# Patient Record
Sex: MALE | Race: BLACK OR AFRICAN AMERICAN | NOT HISPANIC OR LATINO | Employment: PART TIME | URBAN - METROPOLITAN AREA
[De-identification: names, ages, dates, MRNs, and addresses within clinical notes are randomized per-mention and may not be internally consistent; named-entity substitution may affect disease eponyms.]

---
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
## 2024-10-31 ENCOUNTER — OFFICE VISIT (OUTPATIENT)
Dept: FAMILY MEDICINE CLINIC | Facility: CLINIC | Age: 19
End: 2024-10-31
Payer: COMMERCIAL

## 2024-10-31 VITALS
HEIGHT: 72 IN | SYSTOLIC BLOOD PRESSURE: 129 MMHG | OXYGEN SATURATION: 98 % | HEART RATE: 88 BPM | WEIGHT: 140 LBS | BODY MASS INDEX: 18.96 KG/M2 | DIASTOLIC BLOOD PRESSURE: 88 MMHG

## 2024-10-31 DIAGNOSIS — R19.7 DIARRHEA, UNSPECIFIED TYPE: ICD-10-CM

## 2024-10-31 DIAGNOSIS — Z13.29 SCREENING FOR THYROID DISORDER: ICD-10-CM

## 2024-10-31 DIAGNOSIS — F32.A DEPRESSION, UNSPECIFIED DEPRESSION TYPE: ICD-10-CM

## 2024-10-31 DIAGNOSIS — Z13.220 SCREENING FOR CHOLESTEROL LEVEL: ICD-10-CM

## 2024-10-31 DIAGNOSIS — M62.89 MUSCLE STIFFNESS: ICD-10-CM

## 2024-10-31 DIAGNOSIS — F41.9 ANXIETY: ICD-10-CM

## 2024-10-31 DIAGNOSIS — Z00.00 ANNUAL PHYSICAL EXAM: Primary | ICD-10-CM

## 2024-10-31 DIAGNOSIS — R61 NIGHT SWEATS: ICD-10-CM

## 2024-10-31 DIAGNOSIS — Z23 NEED FOR INFLUENZA VACCINATION: ICD-10-CM

## 2024-10-31 NOTE — PROGRESS NOTES
"Chief Complaint  Annual Exam and Diarrhea, depression, anxiety, night sweats, muscle stiffness    Subjective            Neymar Louise presents to [facility]  History of Present Illness  Pt is an annual CPE. Pt has c/o ongoing diarrhea. Pt states this has been off for years.   Pt has tried several home remedies with no relief. Pt states there are no specific triggers for this. Pt states he is lactose intolerant so dairy does affect his system.  He has had negative stool studies in the past and was referred to gasto but never kept the appt.  He would like another referral to gastro for a work up.    Pt has c/o muscle stiffness. Pt thinks this is due to anxiety/depression.   Pt has a depression score of 14. Pt would like a referral to psych. He denies any suicidal thoughts.  He reports he has a long hx of anxiety and depression and has seen a therapist many times before.    Pt is due labs. Pt would like to be checked for HIV as well. He has no known exposure or symptoms he states \"it has been a while since having sex\" he does report some night sweats.    Pt is due flu vaccine. Pt will get today in office.         Past Medical History:   Diagnosis Date    Anxiety     Depression     Wrist pain     Left       No Known Allergies     Past Surgical History:   Procedure Laterality Date    CYST REMOVAL      wrist    EAR TUBES      WRIST GANGLION EXCISION Left 6/20/2022    Procedure: LEFT WRIST GANGLION EXCISION;  Surgeon: Abealrdo Osorio MD;  Location: [facility] OR [facility] [place];  Service: Orthopedics;  Laterality: Left;        Social History     Tobacco Use    Smoking status: Never     Passive exposure: Past    Smokeless tobacco: Never   Substance Use Topics    Alcohol use: Yes     Comment: 2 drinks/week       Family History   Problem Relation Age of Onset    Heart disease Other     Diabetes Other     Malig Hyperthermia Neg Hx         Current Outpatient Medications on File Prior to Visit   Medication " "Sig    ondansetron ODT (ZOFRAN-ODT) 4 MG disintegrating tablet Place 1 tablet on the tongue Every 8 (Eight) Hours As Needed for Nausea or Vomiting.    [DISCONTINUED] dicyclomine (BENTYL) 20 MG tablet Take 1 tablet by mouth Every 8 (Eight) Hours As Needed (diarrhea). (Patient not taking: Reported on 10/31/2024)     No current facility-administered medications on file prior to visit.       Health Maintenance Due   Topic Date Due    INFLUENZA VACCINE  08/01/2024       Objective     /88   Pulse 88   Ht 182.9 cm (72\")   Wt 63.5 kg (140 lb)   SpO2 98%   BMI 18.99 kg/m²       Physical Exam  Constitutional:       General: He is not in acute distress.     Appearance: Normal appearance. He is not ill-appearing.   HENT:      Head: Normocephalic and atraumatic.      Right Ear: Tympanic membrane, ear canal and external ear normal.      Left Ear: Tympanic membrane, ear canal and external ear normal.      Nose: Nose normal.   Cardiovascular:      Rate and Rhythm: Normal rate and regular rhythm.      Heart sounds: Normal heart sounds. No murmur heard.  Pulmonary:      Effort: Pulmonary effort is normal. No respiratory distress.      Breath sounds: Normal breath sounds.   Chest:      Chest wall: No tenderness.   Abdominal:      General: Abdomen is flat. Bowel sounds are normal. There is no distension.      Palpations: Abdomen is soft. There is no mass.      Tenderness: There is no abdominal tenderness. There is no guarding.   Musculoskeletal:         General: No swelling or tenderness. Normal range of motion.      Cervical back: Normal range of motion and neck supple.   Skin:     General: Skin is warm and dry.      Findings: No rash.   Neurological:      General: No focal deficit present.      Mental Status: He is alert and oriented to person, place, and time. Mental status is at baseline.      Gait: Gait normal.   Psychiatric:         Attention and Perception: Attention normal.         Mood and Affect: Mood and affect " normal.         Speech: Speech normal.         Behavior: Behavior normal. Behavior is cooperative.         Thought Content: Thought content normal. Thought content does not include suicidal ideation.         Judgment: Judgment normal.           Result Review :                           Assessment and Plan        Diagnoses and all orders for this visit:    1. Annual physical exam (Primary)  -     CBC w AUTO Differential; Future  -     Comprehensive metabolic panel; Future  -     Lipid panel; Future  -     TSH; Future    2. Diarrhea, unspecified type  -     CBC w AUTO Differential; Future  -     Comprehensive metabolic panel; Future  -     Ambulatory Referral to Gastroenterology    3. Screening for thyroid disorder  -     TSH; Future    4. Screening for cholesterol level  -     Lipid panel; Future    5. Muscle stiffness  -     CBC w AUTO Differential; Future  -     Comprehensive metabolic panel; Future  -     C-reactive protein; Future    6. Need for influenza vaccination  -     Fluzone >6mos ([id number])    7. Depression, unspecified depression type  -     Ambulatory Referral to Psychiatry    8. Anxiety  -     Ambulatory Referral to Psychiatry    9. Night sweats  -     HIV-1 & HIV-2 Antibodies; Future  -     Hepatitis panel, acute; Future      Preventative Counseling:  Healthy diet  Daily exercise  Get adequate sleep        Follow Up     Return in about 1 year (around 10/31/2025), or if symptoms worsen or fail to improve, for Annual physical.    Patient was given instructions and counseling regarding his condition or for health maintenance advice. Please see specific information pulled into the AVS if appropriate.     Neymar Louise  reports that he has never smoked. He has been exposed to tobacco smoke. He has never used smokeless tobacco.

## 2024-11-05 ENCOUNTER — LAB (OUTPATIENT)
Dept: LAB | Facility: HOSPITAL | Age: 19
End: 2024-11-05
Payer: COMMERCIAL

## 2024-11-05 DIAGNOSIS — Z13.29 SCREENING FOR THYROID DISORDER: ICD-10-CM

## 2024-11-05 DIAGNOSIS — M62.89 MUSCLE STIFFNESS: ICD-10-CM

## 2024-11-05 DIAGNOSIS — Z00.00 ANNUAL PHYSICAL EXAM: ICD-10-CM

## 2024-11-05 DIAGNOSIS — R61 NIGHT SWEATS: ICD-10-CM

## 2024-11-05 DIAGNOSIS — R19.7 DIARRHEA, UNSPECIFIED TYPE: ICD-10-CM

## 2024-11-05 DIAGNOSIS — Z13.220 SCREENING FOR CHOLESTEROL LEVEL: ICD-10-CM

## 2024-11-05 LAB
ALBUMIN SERPL-MCNC: 4.7 G/DL (ref 3.5–5.2)
ALBUMIN/GLOB SERPL: 1.8 G/DL
ALP SERPL-CCNC: 80 U/L (ref 39–117)
ALT SERPL W P-5'-P-CCNC: 11 U/L (ref 1–41)
ANION GAP SERPL CALCULATED.3IONS-SCNC: 9.8 MMOL/L (ref 5–15)
AST SERPL-CCNC: 12 U/L (ref 1–40)
BASOPHILS # BLD AUTO: 0.07 10*3/MM3 (ref 0–0.2)
BASOPHILS NFR BLD AUTO: 0.9 % (ref 0–1.5)
BILIRUB SERPL-MCNC: 0.9 MG/DL (ref 0–1.2)
BUN SERPL-MCNC: 5 MG/DL (ref 6–20)
BUN/CREAT SERPL: 5.5 (ref 7–25)
CALCIUM SPEC-SCNC: 9.8 MG/DL (ref 8.6–10.5)
CHLORIDE SERPL-SCNC: 103 MMOL/L (ref 98–107)
CHOLEST SERPL-MCNC: 118 MG/DL (ref 0–200)
CO2 SERPL-SCNC: 27.2 MMOL/L (ref 22–29)
CREAT SERPL-MCNC: 0.91 MG/DL (ref 0.76–1.27)
CRP SERPL-MCNC: <0.3 MG/DL (ref 0–0.5)
DEPRECATED RDW RBC AUTO: 42 FL (ref 37–54)
EGFRCR SERPLBLD CKD-EPI 2021: 124.5 ML/MIN/1.73
EOSINOPHIL # BLD AUTO: 0.05 10*3/MM3 (ref 0–0.4)
EOSINOPHIL NFR BLD AUTO: 0.6 % (ref 0.3–6.2)
ERYTHROCYTE [DISTWIDTH] IN BLOOD BY AUTOMATED COUNT: 13.9 % (ref 12.3–15.4)
GLOBULIN UR ELPH-MCNC: 2.6 GM/DL
GLUCOSE SERPL-MCNC: 92 MG/DL (ref 65–99)
HAV IGM SERPL QL IA: NORMAL
HBV CORE IGM SERPL QL IA: NORMAL
HBV SURFACE AG SERPL QL IA: NORMAL
HCT VFR BLD AUTO: 45.1 % (ref 37.5–51)
HCV AB SER QL: NORMAL
HDLC SERPL-MCNC: 37 MG/DL (ref 40–60)
HGB BLD-MCNC: 15.3 G/DL (ref 13–17.7)
HIV 1+2 AB+HIV1 P24 AG SERPL QL IA: NORMAL
IMM GRANULOCYTES # BLD AUTO: 0.01 10*3/MM3 (ref 0–0.05)
IMM GRANULOCYTES NFR BLD AUTO: 0.1 % (ref 0–0.5)
LDLC SERPL CALC-MCNC: 68 MG/DL (ref 0–100)
LDLC/HDLC SERPL: 1.86 {RATIO}
LYMPHOCYTES # BLD AUTO: 1.85 10*3/MM3 (ref 0.7–3.1)
LYMPHOCYTES NFR BLD AUTO: 22.8 % (ref 19.6–45.3)
MCH RBC QN AUTO: 28.5 PG (ref 26.6–33)
MCHC RBC AUTO-ENTMCNC: 33.9 G/DL (ref 31.5–35.7)
MCV RBC AUTO: 84 FL (ref 79–97)
MONOCYTES # BLD AUTO: 0.63 10*3/MM3 (ref 0.1–0.9)
MONOCYTES NFR BLD AUTO: 7.8 % (ref 5–12)
NEUTROPHILS NFR BLD AUTO: 5.49 10*3/MM3 (ref 1.7–7)
NEUTROPHILS NFR BLD AUTO: 67.8 % (ref 42.7–76)
NRBC BLD AUTO-RTO: 0 /100 WBC (ref 0–0.2)
PLATELET # BLD AUTO: 292 10*3/MM3 (ref 140–450)
PMV BLD AUTO: 11 FL (ref 6–12)
POTASSIUM SERPL-SCNC: 3.8 MMOL/L (ref 3.5–5.2)
PROT SERPL-MCNC: 7.3 G/DL (ref 6–8.5)
RBC # BLD AUTO: 5.37 10*6/MM3 (ref 4.14–5.8)
SODIUM SERPL-SCNC: 140 MMOL/L (ref 136–145)
TRIGL SERPL-MCNC: 60 MG/DL (ref 0–150)
TSH SERPL DL<=0.05 MIU/L-ACNC: 0.68 UIU/ML (ref 0.27–4.2)
VLDLC SERPL-MCNC: 13 MG/DL (ref 5–40)
WBC NRBC COR # BLD AUTO: 8.1 10*3/MM3 (ref 3.4–10.8)

## 2024-11-05 PROCEDURE — 80061 LIPID PANEL: CPT

## 2024-11-05 PROCEDURE — 80074 ACUTE HEPATITIS PANEL: CPT

## 2024-11-05 PROCEDURE — 86140 C-REACTIVE PROTEIN: CPT

## 2024-11-05 PROCEDURE — 80050 GENERAL HEALTH PANEL: CPT

## 2024-11-05 PROCEDURE — 36415 COLL VENOUS BLD VENIPUNCTURE: CPT

## 2024-11-05 PROCEDURE — G0432 EIA HIV-1/HIV-2 SCREEN: HCPCS

## 2024-11-06 ENCOUNTER — TELEPHONE (OUTPATIENT)
Dept: FAMILY MEDICINE CLINIC | Facility: CLINIC | Age: 19
End: 2024-11-06
Payer: COMMERCIAL

## 2024-11-06 NOTE — TELEPHONE ENCOUNTER
----- Message from Yamileth NEUMANN sent at 11/6/2024  6:45 AM EST -----    ----- Message -----  From: Nanette Hare APRN  Sent: 11/6/2024   6:16 AM EST  To: Yamileth coburn

## 2024-11-06 NOTE — TELEPHONE ENCOUNTER
Name: Dani Neymar    Relationship: Self    Best Callback Number: [phone]     HUB PROVIDED THE RELAY MESSAGE FROM THE OFFICE   PATIENT VOICED UNDERSTANDING AND HAS NO FURTHER QUESTIONS AT THIS TIME

## 2025-01-09 ENCOUNTER — OFFICE VISIT (OUTPATIENT)
Dept: BEHAVIORAL HEALTH | Facility: CLINIC | Age: 20
End: 2025-01-09
Payer: COMMERCIAL

## 2025-01-09 VITALS
HEART RATE: 73 BPM | HEIGHT: 72 IN | BODY MASS INDEX: 22.21 KG/M2 | WEIGHT: 164 LBS | DIASTOLIC BLOOD PRESSURE: 64 MMHG | SYSTOLIC BLOOD PRESSURE: 112 MMHG

## 2025-01-09 DIAGNOSIS — F42.2 MIXED OBSESSIONAL THOUGHTS AND ACTS: Primary | ICD-10-CM

## 2025-01-09 DIAGNOSIS — G47.00 INSOMNIA, UNSPECIFIED TYPE: ICD-10-CM

## 2025-01-09 DIAGNOSIS — F17.290 OTHER TOBACCO PRODUCT NICOTINE DEPENDENCE, UNCOMPLICATED: ICD-10-CM

## 2025-01-09 DIAGNOSIS — F33.2 SEVERE EPISODE OF RECURRENT MAJOR DEPRESSIVE DISORDER, WITHOUT PSYCHOTIC FEATURES: ICD-10-CM

## 2025-01-09 RX ORDER — MIRTAZAPINE 15 MG/1
15 TABLET, FILM COATED ORAL NIGHTLY
Qty: 90 TABLET | Refills: 0 | Status: SHIPPED | OUTPATIENT
Start: 2025-01-09 | End: 2025-04-09
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
## 2025-01-27 ENCOUNTER — TELEMEDICINE (OUTPATIENT)
Dept: BEHAVIORAL HEALTH | Facility: CLINIC | Age: 20
End: 2025-01-27
Payer: COMMERCIAL

## 2025-01-27 DIAGNOSIS — F41.1 GENERALIZED ANXIETY DISORDER: ICD-10-CM

## 2025-01-27 DIAGNOSIS — F33.2 SEVERE EPISODE OF RECURRENT MAJOR DEPRESSIVE DISORDER, WITHOUT PSYCHOTIC FEATURES: Primary | ICD-10-CM

## 2025-01-27 PROCEDURE — 90791 PSYCH DIAGNOSTIC EVALUATION: CPT | Performed by: SOCIAL WORKER

## 2025-01-27 PROCEDURE — 1160F RVW MEDS BY RX/DR IN RCRD: CPT | Performed by: SOCIAL WORKER

## 2025-01-27 PROCEDURE — 1159F MED LIST DOCD IN RCRD: CPT | Performed by: SOCIAL WORKER
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
## 2025-02-11 ENCOUNTER — OFFICE VISIT (OUTPATIENT)
Dept: BEHAVIORAL HEALTH | Facility: CLINIC | Age: 20
End: 2025-02-11
Payer: COMMERCIAL

## 2025-02-11 DIAGNOSIS — F41.1 GENERALIZED ANXIETY DISORDER: ICD-10-CM

## 2025-02-11 DIAGNOSIS — F33.2 SEVERE EPISODE OF RECURRENT MAJOR DEPRESSIVE DISORDER, WITHOUT PSYCHOTIC FEATURES: Primary | ICD-10-CM

## 2025-02-11 NOTE — PROGRESS NOTES
"  [facility]   [address]  [address]  [place] [address]     Referring physician/provider: No ref. provider found   PCP: Nanette Hare APRN    Date of service: 2/11/2025        Time In: 2:01 PM  Time Out: 2:58 PM    Patient Name: Neymar Louise  Patient Age: 19 y.o.    Chief Complaint: Depression and Anxiety     Subjective          Neymar is a 19 y.o. male who presents today as a follow-up for continued psychotherapy. PT reports he has felt \"unnecessary bouts of anger\" and feels this is related to his medication as this was not something that was happening prior to beginning the medication. PT reports being on edge, more hypervigilant and feels constant paranoia. PT reports he has felt this way since \"teenage years\". PT described feeling he had to be hyper aware of others as he felt extremely judged by his grandmother growing up. PT discussed distress related to memory as he will often walk into a room and not remember why he came into the room. PT also discussed falling asleep at times when he \"shouldn't\" such as when driving or in the shower and has fallen asleep on the stairs. When discussing memory, PT discussed feeling as though  he is often thinking of \"96\" things at once, and described going in and out of the bathroom around \"20 times\" before being able to shower.       Assessment    Mental Status Exam     Appearance: good hygiene and dressed appropriately for the weather  Behavior: calm  Cooperation:  engaged, cooperative, attentive, and friendly  Eye Contact:  good  Affect:  bright  Mood: euthymic and expressive  Speech: responsive  Thought Process:  flight of ideas at times  Thought Content: appropriate  Suicidal: denies  Homicidal:  denies  Hallucinations:  denies  Memory:  intact during session, Reports memory difficulties (forgetting why walked into room)  Orientation:  person, place, time, and situation  Reliability:  reliable  Insight:  good  Judgement:  good "     Clinical Intervention     Diagnoses and all orders for this visit:    1. Severe episode of recurrent major depressive disorder, without psychotic features (Primary)    2. Generalized anxiety disorder          Individual psychotherapy was provided utilizing CBT techniques to build rapport, encourage expression of thoughts and feelings, identify goals for treatment, identify triggers, recognize patterns of behavior, acknowledge sources of feelings and behaviors, assess symptoms, recognize cognitive distortions, and provide support. PT and therapist discussed additional symptoms PT experiences and explored the possibility of ADHD. Therapist utilized reflective listening and validated PT's feelings.     Plan   Plan & Goals     PT will focus on symptoms experiencing.       Patient acknowledged and verbally consented to continue working toward resolving current treatment plan goals and was educated on the importance of participation in the therapeutic process.  Patient will remain compliant with medication regimen as prescribed. Discuss any medication side effects, questions or concerns with prescribing provider.  Call 911 or present to the nearest emergency room in an emergency situation.   National Suicide Prevention Lifeline: Call 988. The Lifeline provides 24/7, free and confidential support for people in distress, prevention and crisis resources.  Crisis Text Line  Text HOME To 777601    Return in 2 weeks (on 2/25/2025).    ____________________    Patient's questions were answered.  Thank you for allowing me to participate in the care of this patient.  Dictated Utilizing Dragon Dictation. Please note that portions of this note were completed with a voice recognition program. Part of this note may be an electronic transcription/translation of spoken language to printed text using the Dragon Dictation System.      [facility]   Kathy Knight LCSW  02/11/25  14:56 EST

## 2025-02-13 ENCOUNTER — OFFICE VISIT (OUTPATIENT)
Dept: BEHAVIORAL HEALTH | Facility: CLINIC | Age: 20
End: 2025-02-13
Payer: COMMERCIAL

## 2025-02-13 VITALS
WEIGHT: 173 LBS | BODY MASS INDEX: 23.43 KG/M2 | SYSTOLIC BLOOD PRESSURE: 118 MMHG | HEIGHT: 72 IN | HEART RATE: 74 BPM | DIASTOLIC BLOOD PRESSURE: 64 MMHG

## 2025-02-13 DIAGNOSIS — G47.00 INSOMNIA, UNSPECIFIED TYPE: ICD-10-CM

## 2025-02-13 DIAGNOSIS — F42.2 MIXED OBSESSIONAL THOUGHTS AND ACTS: ICD-10-CM

## 2025-02-13 DIAGNOSIS — F33.1 MODERATE EPISODE OF RECURRENT MAJOR DEPRESSIVE DISORDER: Primary | ICD-10-CM

## 2025-02-13 RX ORDER — ESCITALOPRAM OXALATE 10 MG/1
TABLET ORAL
Qty: 30 TABLET | Refills: 1 | Status: SHIPPED | OUTPATIENT
Start: 2025-02-13

## 2025-02-13 RX ORDER — TRAZODONE HYDROCHLORIDE 50 MG/1
TABLET, FILM COATED ORAL
Qty: 60 TABLET | Refills: 1 | Status: SHIPPED | OUTPATIENT
Start: 2025-02-13
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
## 2025-02-27 ENCOUNTER — TELEPHONE (OUTPATIENT)
Dept: GASTROENTEROLOGY | Facility: CLINIC | Age: 20
End: 2025-02-27

## 2025-02-27 NOTE — TELEPHONE ENCOUNTER
I attempted to contact  Neymar Louise 2005 regarding the appointment no show with [facility] Garcia on 02/27/25@11:00. Patient is aware that there is a 24-hour cancellation policy and understands that a no-show letter will be mailed to them at the address on file.Left message if patient would like to reschedule appointment to call the office.

## 2025-02-28 ENCOUNTER — TELEMEDICINE (OUTPATIENT)
Dept: BEHAVIORAL HEALTH | Facility: CLINIC | Age: 20
End: 2025-02-28
Payer: COMMERCIAL

## 2025-02-28 DIAGNOSIS — F33.2 SEVERE EPISODE OF RECURRENT MAJOR DEPRESSIVE DISORDER, WITHOUT PSYCHOTIC FEATURES: Primary | ICD-10-CM

## 2025-02-28 DIAGNOSIS — F41.1 GENERALIZED ANXIETY DISORDER: ICD-10-CM

## 2025-02-28 NOTE — PLAN OF CARE
Care plan was developed on this date. Please review the care plan and/or the progress note associated with this session for details.

## 2025-02-28 NOTE — PROGRESS NOTES
[facility] BE[facility]AVIOR[facility] HE[facility]   [address]  [address]  [place] [address]     Referring physician/provider: No ref. provider found   PCP: Nanette Hare APRN    Video Visit  Date of service: 2/28/2025    Time In: 2:57 PM  Time Out: 3:29 PM    Patient Legal Name: Neymar Louise  Patient Age: 19 y.o.    Mode of visit: Video  Location of provider: [address]  [address]  [place] [address]   Location of patient: PT is at home in [place], [place]    Patient is seen remotely using [employer]. Patient is being seen via telehealth and patient confirms that they are in a secure environment for this session. The patient's condition being diagnosed/treated is appropriate for telemedicine. The provider identified herself as well as her credentials. The patient gave consent to be seen remotely, and when consent is given they understand that the consent allows for patient identifiable information to be sent to a third party as needed. They may refuse to be seen remotely at any time. The electronic data is encrypted and password protected, and the patient has been advised of the potential risks to privacy not withstanding such measures. Patient consented to the use of video for the purpose of a telehealth session today. The visit included audio and video interaction. No technical issues occurred during this visit.    CHIEF COMPLAINT: Anxiety       Subjective   History of Present Illness     Neymar is a 19 y.o. male presents today for follow up for psychotherapy. PT discussed continued difficulty with falling asleep, stating he takes his medication around 8:30-9:30 PM and cannot fall asleep until around 3 AM. PT described disassociating at times, and finds that this happens at times when he is bored. PT describes flight of ideas and being easily distracted when attempting to complete tasks. PT also discussed anxiety related to going places related to how he is being perceived by others. Session ended more  abruptly due to PT's phone battery being low.     Assessment    Mental Status Exam     Appearance: appeared to have good hygiene  Behavior: calm  Cooperation:  engaged, cooperative, attentive, and friendly  Eye Contact:  good  Affect:  bright  Mood: euthymic, expressive  Speech: responsive and rapid at times  Thought Process:  flight of ideas at times  Thought Content: appropriate and distracted at times  Suicidal: denies  Homicidal:  denies  Hallucinations:  denies  Memory:  intact  Orientation:  person, place, time, and situation  Reliability:  reliable  Insight:  good  Judgment:  good     Clinical Intervention     Diagnoses and all orders for this visit:    1. Severe episode of recurrent major depressive disorder, without psychotic features (Primary)    2. Generalized anxiety disorder        Neymar is a 19 y.o. male who presents today as a follow-up for continued psychotherapy. Individual psychotherapy was provided utilizing CBT techniques to provide symptom relief, build rapport, encourage expression of thoughts and feelings, identify triggers, confront irrational ideas, gather history, and provide support. Therapist provided interventions to assist with ADHD type symptoms (labeling tasks). Therapist utilized reflective listening and validated PT's feelings.      Plan   Plan & Goals     PT will utilize interventions to assist with task completion.       Patient acknowledged and verbally consented to continue working toward resolving current treatment plan goals and was educated on the importance of participation in the therapeutic process.  Patient will remain compliant with medication regimen as prescribed. Discuss any medication side effects, questions or concerns with prescribing provider.  Call 911 or present to the nearest emergency room in an emergency situation.   National Suicide Prevention Lifeline: Call 988. The Lifeline provides 24/7, free and confidential support for people in distress, prevention  and crisis resources.  Crisis Text Line  Text HOME To 941463    Return in 2 weeks (on 3/14/2025).    ____________________  Patient's questions were answered.  Thank you for allowing me to participate in the care of this patient.  Dictated Utilizing Dragon Dictation. Please note that portions of this note were completed with a voice recognition program. Part of this note may be an electronic transcription/translation of spoken language to printed text using the Dragon Dictation System.      [facility] BE[facility]AVIORAL HE[facility]   Kathy Knight LCSW  02/28/25  15:57 EST

## 2025-03-21 ENCOUNTER — TELEMEDICINE (OUTPATIENT)
Dept: BEHAVIORAL HEALTH | Facility: CLINIC | Age: 20
End: 2025-03-21
Payer: COMMERCIAL

## 2025-03-21 DIAGNOSIS — F51.05 INSOMNIA DUE TO OTHER MENTAL DISORDER: ICD-10-CM

## 2025-03-21 DIAGNOSIS — F33.1 MODERATE EPISODE OF RECURRENT MAJOR DEPRESSIVE DISORDER: Primary | ICD-10-CM

## 2025-03-21 DIAGNOSIS — F99 INSOMNIA DUE TO OTHER MENTAL DISORDER: ICD-10-CM

## 2025-03-21 DIAGNOSIS — F42.2 MIXED OBSESSIONAL THOUGHTS AND ACTS: ICD-10-CM

## 2025-03-21 RX ORDER — QUETIAPINE FUMARATE 50 MG/1
TABLET, FILM COATED ORAL
Qty: 60 TABLET | Refills: 1 | Status: SHIPPED | OUTPATIENT
Start: 2025-03-21

## 2025-03-21 RX ORDER — BUPROPION HYDROCHLORIDE 150 MG/1
150 TABLET ORAL EVERY MORNING
Qty: 30 TABLET | Refills: 1 | Status: SHIPPED | OUTPATIENT
Start: 2025-03-21
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
## (undated) DEVICE — DRSNG GZ PETROLTM XEROFORM CURAD 1X8IN STRL

## (undated) DEVICE — UNDERCAST PADDING: Brand: DEROYAL

## (undated) DEVICE — GLV SURG SENSICARE W/ALOE PF LF 8.5 STRL

## (undated) DEVICE — 3M™ STERI-STRIP™ REINFORCED ADHESIVE SKIN CLOSURES, R1547, 1/2 IN X 4 IN (12 MM X 100 MM), 6 STRIPS/ENVELOPE: Brand: 3M™ STERI-STRIP™

## (undated) DEVICE — INTENDED FOR TISSUE SEPARATION, AND OTHER PROCEDURES THAT REQUIRE A SHARP SURGICAL BLADE TO PUNCTURE OR CUT.: Brand: BARD-PARKER ® CARBON RIB-BACK BLADES

## (undated) DEVICE — EXTREMITY-LF: Brand: MEDLINE INDUSTRIES, INC.

## (undated) DEVICE — SUT VIC 3/0 SH 27IN J416H

## (undated) DEVICE — ADHS LIQ MASTISOL 2/3ML

## (undated) DEVICE — BNDG ESMARK 4IN 12FT LF STRL BLU

## (undated) DEVICE — BNDG ELAS ECON W/CLIP 3IN 5YD LF STRL

## (undated) DEVICE — SUT MNCRYL PLS ANTIB UD 3/0 PS2 27IN

## (undated) DEVICE — PENCL E/S SMOKEEVAC W/TELESCP CANN

## (undated) DEVICE — GAUZE,SPONGE,4"X4",16PLY,STRL,LF,10/TRAY: Brand: MEDLINE

## (undated) DEVICE — SUT ETHLN 3-0 FS118IN 663H

## (undated) DEVICE — GLV SURG SENSICARE PI ORTHO SZ8 LF STRL

## (undated) DEVICE — APPL CHLORAPREP HI/LITE 26ML ORNG

## (undated) DEVICE — GLV SURG SENSICARE SLT PF LF 7.5 STRL